# Patient Record
Sex: FEMALE | URBAN - METROPOLITAN AREA
[De-identification: names, ages, dates, MRNs, and addresses within clinical notes are randomized per-mention and may not be internally consistent; named-entity substitution may affect disease eponyms.]

---

## 2021-09-03 ENCOUNTER — CLINICAL DOCUMENTATION (OUTPATIENT)
Dept: ONCOLOGY | Age: 71
End: 2021-09-03

## 2023-08-10 ENCOUNTER — CLINICAL DOCUMENTATION (OUTPATIENT)
Dept: ONCOLOGY | Age: 73
End: 2023-08-10

## 2023-08-10 NOTE — PROGRESS NOTES
INR results    No results found for: PROTIME, INR    PT-28.8  INR-2.4    Camron Mustafa is currently on 9 mg of Coumadin daily. Per  pt instructed to cont same dose and  recheck in one month. Patient verbally understood.